# Patient Record
Sex: MALE | Employment: UNEMPLOYED | URBAN - METROPOLITAN AREA
[De-identification: names, ages, dates, MRNs, and addresses within clinical notes are randomized per-mention and may not be internally consistent; named-entity substitution may affect disease eponyms.]

---

## 2024-05-16 ENCOUNTER — APPOINTMENT (OUTPATIENT)
Dept: CT IMAGING | Age: 29
End: 2024-05-16
Payer: COMMERCIAL

## 2024-05-16 ENCOUNTER — HOSPITAL ENCOUNTER (EMERGENCY)
Age: 29
Discharge: HOME | End: 2024-05-16
Attending: EMERGENCY MEDICINE
Payer: COMMERCIAL

## 2024-05-16 VITALS
HEART RATE: 82 BPM | HEIGHT: 67 IN | DIASTOLIC BLOOD PRESSURE: 72 MMHG | WEIGHT: 150 LBS | SYSTOLIC BLOOD PRESSURE: 124 MMHG | TEMPERATURE: 97 F | BODY MASS INDEX: 23.54 KG/M2 | RESPIRATION RATE: 18 BRPM | OXYGEN SATURATION: 99 %

## 2024-05-16 DIAGNOSIS — S09.90XA INJURY OF HEAD, INITIAL ENCOUNTER: Primary | ICD-10-CM

## 2024-05-16 DIAGNOSIS — S01.81XA FACIAL LACERATION, INITIAL ENCOUNTER: ICD-10-CM

## 2024-05-16 PROCEDURE — 90715 TDAP VACCINE 7 YRS/> IM: CPT | Performed by: EMERGENCY MEDICINE

## 2024-05-16 PROCEDURE — 90471 IMMUNIZATION ADMIN: CPT | Performed by: EMERGENCY MEDICINE

## 2024-05-16 PROCEDURE — 6360000002 HC RX W HCPCS: Performed by: EMERGENCY MEDICINE

## 2024-05-16 PROCEDURE — 12011 RPR F/E/E/N/L/M 2.5 CM/<: CPT

## 2024-05-16 PROCEDURE — 99284 EMERGENCY DEPT VISIT MOD MDM: CPT

## 2024-05-16 PROCEDURE — 70450 CT HEAD/BRAIN W/O DYE: CPT

## 2024-05-16 PROCEDURE — 70486 CT MAXILLOFACIAL W/O DYE: CPT

## 2024-05-16 PROCEDURE — 12013 RPR F/E/E/N/L/M 2.6-5.0 CM: CPT

## 2024-05-16 PROCEDURE — 6370000000 HC RX 637 (ALT 250 FOR IP): Performed by: EMERGENCY MEDICINE

## 2024-05-16 RX ORDER — IBUPROFEN 800 MG/1
800 TABLET ORAL EVERY 8 HOURS PRN
Qty: 15 TABLET | Refills: 0 | Status: SHIPPED | OUTPATIENT
Start: 2024-05-16

## 2024-05-16 RX ORDER — IBUPROFEN 600 MG/1
600 TABLET ORAL ONCE
Status: COMPLETED | OUTPATIENT
Start: 2024-05-16 | End: 2024-05-16

## 2024-05-16 RX ORDER — BACITRACIN ZINC AND POLYMYXIN B SULFATE 500; 1000 [USP'U]/G; [USP'U]/G
OINTMENT TOPICAL
Qty: 15 G | Refills: 1 | Status: SHIPPED | OUTPATIENT
Start: 2024-05-16 | End: 2024-05-23

## 2024-05-16 RX ORDER — OXYCODONE HYDROCHLORIDE AND ACETAMINOPHEN 5; 325 MG/1; MG/1
2 TABLET ORAL ONCE
Status: COMPLETED | OUTPATIENT
Start: 2024-05-16 | End: 2024-05-16

## 2024-05-16 RX ADMIN — OXYCODONE HYDROCHLORIDE AND ACETAMINOPHEN 2 TABLET: 5; 325 TABLET ORAL at 20:57

## 2024-05-16 RX ADMIN — TETANUS TOXOID, REDUCED DIPHTHERIA TOXOID AND ACELLULAR PERTUSSIS VACCINE, ADSORBED 0.5 ML: 5; 2.5; 8; 8; 2.5 SUSPENSION INTRAMUSCULAR at 20:56

## 2024-05-16 RX ADMIN — IBUPROFEN 600 MG: 600 TABLET, FILM COATED ORAL at 22:01

## 2024-05-16 ASSESSMENT — PAIN DESCRIPTION - LOCATION
LOCATION: FACE
LOCATION: HEAD
LOCATION: HEAD

## 2024-05-16 ASSESSMENT — PAIN SCALES - GENERAL
PAINLEVEL_OUTOF10: 8
PAINLEVEL_OUTOF10: 6
PAINLEVEL_OUTOF10: 6

## 2024-05-16 ASSESSMENT — ENCOUNTER SYMPTOMS
BACK PAIN: 0
SHORTNESS OF BREATH: 0
NAUSEA: 0
COUGH: 0
VOMITING: 0

## 2024-05-16 ASSESSMENT — LIFESTYLE VARIABLES
HOW OFTEN DO YOU HAVE A DRINK CONTAINING ALCOHOL: NEVER
HOW MANY STANDARD DRINKS CONTAINING ALCOHOL DO YOU HAVE ON A TYPICAL DAY: PATIENT DOES NOT DRINK

## 2024-05-16 ASSESSMENT — PAIN DESCRIPTION - DESCRIPTORS
DESCRIPTORS: ACHING
DESCRIPTORS: THROBBING

## 2024-05-16 ASSESSMENT — PAIN - FUNCTIONAL ASSESSMENT: PAIN_FUNCTIONAL_ASSESSMENT: 0-10

## 2024-05-17 NOTE — ED NOTES
Pt presents to the ED c/o head injury over left eye that occurred at work an hour ago.   Pt denies any LOC but states he did experience dizziness at the time of injury as well as ongoing dizziness now.  Pt denies any nausea, vomiting, or vision changes.   Pt states the injury occurred while he was operating a retracting metal drill at work and the drill malfunctioned and hit him in the head.   Pt states he is up to date on his tetanus vaccine and received a booster 2 yrs ago.   Pt A&O x 4, does not appear in acute distress, RR even and unlabored.  Pt lying on stretcher with eyes open.   Vital signs obtained, medical hx and allergies reviewed with pt.   Triage completed.

## 2024-05-17 NOTE — ED PROVIDER NOTES
[facility] ED  EMERGENCY DEPARTMENT ENCOUNTER      Pt Name: Raf Barrera  MRN: [id number]  Birthdate 1995  Date of evaluation: 5/16/24  PCP:  No primary care provider on file.    CHIEF COMPLAINT:   Chief Complaint   Patient presents with    Head Injury     Denies LOC     HISTORY OF PRESENT ILLNESS   Raf Barrera is a 28 y.o. male whopresents with patient presents with injury to left hand.  Patient was at work and automated drill that hung from the ceiling excellently pulled away and hit him in the left eyebrow.  He denies loss of consciousness.  He states he was dizzy at the time of injury.  He denies nausea headache visual changes.  He denies any anticoagulation.  Sure of last tetanus.  He denies any pain to his neck, chest abdomen or pelvis or anywhere other than his left eyebrow.        REVIEW OF SYSTEMS       Review of Systems   Constitutional:  Negative for chills and fever.   Respiratory:  Negative for cough and shortness of breath.    Cardiovascular:  Negative for chest pain.   Gastrointestinal:  Negative for nausea and vomiting.   Musculoskeletal:  Negative for back pain and neck pain.   Neurological:  Positive for headaches. Negative for dizziness and loss of consciousness.           PAST MEDICAL HISTORY   PMH:  has a past medical history of Abnormal weight gain, ADHD (attention deficit hyperactivity disorder), Exposure to STD, History of allergy, Joint pain, knee, Learning disability, and Schlatter-Osgood disease.  SurgicalHistory:  has no past surgical history on file.  Social History:  reports that he has never smoked. He does not have any smokeless tobacco history on file. He reports current alcohol use. He reports current drug use. Frequency: 3.00 times per week. Drug: Marijuana (Weed).  Family History: Noncontributory at this time  Psychiatric History: Noncontributory at this time    Allergies:has No Known Allergies.      PHYSICAL EXAM     INITIAL VITALS: /72    [facility]  [address]  [facility] [address] [phone]    In 5 to 7 days for suture removal      DISCHARGEMEDICATIONS:  New Prescriptions    BACITRACIN-POLYMYXIN B (POLYSPORIN) 500-84851 UNIT/GM OINTMENT    Apply topically 2 times daily.    IBUPROFEN (ADVIL;MOTRIN) 800 MG TABLET    Take 1 tablet by mouth every 8 hours as needed for Pain       (Please note that portions of this note were completed with a voice recognition program.  Efforts were made to edit thedictations but occasionally words are mis-transcribed.)    Estuardo Aguero MD Attending Emergency Medicine Physician          Yomi Aguero MD  05/16/24 [id number]

## 2024-05-17 NOTE — ED NOTES
Report taken from previous shift RN. Pt introduced to writer. Pt resting in bed, respirations even and unlabored, NAD. Pt has no verbalized needs at this time. Whiteboards updated.

## 2024-05-21 ENCOUNTER — HOSPITAL ENCOUNTER (EMERGENCY)
Age: 29
Discharge: HOME | End: 2024-05-21
Attending: EMERGENCY MEDICINE
Payer: COMMERCIAL

## 2024-05-21 VITALS
DIASTOLIC BLOOD PRESSURE: 85 MMHG | SYSTOLIC BLOOD PRESSURE: 142 MMHG | HEART RATE: 77 BPM | RESPIRATION RATE: 14 BRPM | OXYGEN SATURATION: 100 % | TEMPERATURE: 98.2 F

## 2024-05-21 DIAGNOSIS — Z48.02 VISIT FOR SUTURE REMOVAL: Primary | ICD-10-CM

## 2024-05-21 DIAGNOSIS — S01.80XD OPEN WOUND OF FOREHEAD, SUBSEQUENT ENCOUNTER: ICD-10-CM

## 2024-05-21 PROCEDURE — 99282 EMERGENCY DEPT VISIT SF MDM: CPT

## 2024-05-21 ASSESSMENT — PAIN DESCRIPTION - DESCRIPTORS: DESCRIPTORS: OTHER (COMMENT)

## 2024-05-21 ASSESSMENT — ENCOUNTER SYMPTOMS
GASTROINTESTINAL NEGATIVE: 1
RESPIRATORY NEGATIVE: 1
FACIAL SWELLING: 0

## 2024-05-21 ASSESSMENT — PAIN DESCRIPTION - ORIENTATION: ORIENTATION: LEFT

## 2024-05-21 ASSESSMENT — PAIN - FUNCTIONAL ASSESSMENT
PAIN_FUNCTIONAL_ASSESSMENT: ACTIVITIES ARE NOT PREVENTED
PAIN_FUNCTIONAL_ASSESSMENT: 0-10

## 2024-05-21 ASSESSMENT — PAIN SCALES - GENERAL: PAINLEVEL_OUTOF10: 3

## 2024-05-21 NOTE — ED NOTES
Pt to ED needing sutures removed in left eyebrow. Pt states sutures were placed 5 days ago and was told to come back in 5 days. Pt reports some tenderness in affected area. Pt denies any bleeding, but has one day with some green discharge. Site is clean and does not appear infected. Pt alert and oriented x4, talking in complete sentences, respirations even and unlabored. Pt acting age appropriate. White board updated, will continue to plan of care

## 2024-05-21 NOTE — DISCHARGE INSTRUCTIONS
You were seen for removal of sutures on your forehead (left eyebrow)  5 Stitched were taken out with no difficulty.  Keep area dry and clean.   You do not need a bandage or tape to cover the wound.    In case of drainage, redness, swelling near wound, contact your PCP or return to ED

## 2024-05-21 NOTE — ED PROVIDER NOTES
[facility] ED  Emergency Department Encounter  Emergency Medicine Resident     Pt Name:Raf Barrera  MRN: [id number]  Birthdate 1995  Date of evaluation: 5/21/24  PCP:  aNveed Gonzalez MD  Note Started: 2:48 PM EDT      CHIEF COMPLAINT       Chief Complaint   Patient presents with    Suture / Staple Removal     Left eyebrow       HISTORY OF PRESENT ILLNESS  (Location/Symptom, Timing/Onset, Context/Setting, Quality, Duration, Modifying Factors, Severity.)      Raf Barrera is a 28 y.o. male who presents with need for suture removal.  Patient had an injury left eyebrow while at work on the 16th, was seen in the ED and stitches were placed on his laceration..  Patient was asked to return in 5 days for suture removal.  Denies any discharge, bleeding, swelling to the area.  Does report mild tenderness.  Denies any fevers or chills.  Denies any history of diabetes or cigarette smoking.    PAST MEDICAL / SURGICAL / SOCIAL / FAMILY HISTORY      has a past medical history of Abnormal weight gain, ADHD (attention deficit hyperactivity disorder), Exposure to STD, History of allergy, Joint pain, knee, Learning disability, and Schlatter-Osgood disease.       has no past surgical history on file.      Social History     Socioeconomic History    Marital status: Single     Spouse name: Not on file    Number of children: Not on file    Years of education: Not on file    Highest education level: Not on file   Occupational History    Not on file   Tobacco Use    Smoking status: Never    Smokeless tobacco: Not on file   Substance and Sexual Activity    Alcohol use: Yes     Comment: 2-3 time    Drug use: Yes     Frequency: 3.0 times per week     Types: Marijuana (Weed)    Sexual activity: Not on file   Other Topics Concern    Not on file   Social History Narrative    Not on file     Social Determinants of Health     Financial Resource Strain: Not on file   Food Insecurity: Not on file    Transportation Needs: Not on file   Physical Activity: Not on file   Stress: Not on file   Social Connections: Not on file   Intimate Partner Violence: Not on file   Housing Stability: Not on file       No family history on file.    Allergies:  Patient has no known allergies.    Home Medications:  Prior to Admission medications    Medication Sig Start Date End Date Taking? Authorizing Provider   ibuprofen (ADVIL;MOTRIN) 800 MG tablet Take 1 tablet by mouth every 8 hours as needed for Pain 5/16/24   Yomi Aguero MD   bacitracin-polymyxin b (POLYSPORIN) 500-02676 UNIT/GM ointment Apply topically 2 times daily. 5/16/24 5/23/24  Yomi Aguero MD   Lisdexamfetamine Dimesylate (VYVANSE) 40 MG CAPS Take one capsule daily morning. 7/25/14   Dion Mitchell MD   doxycycline (VIBRAMYCIN) 100 MG capsule Take 100 mg by mouth 2 times daily.    Provider, MD Miryam         REVIEW OF SYSTEMS       Review of Systems   Constitutional:  Negative for chills, fatigue and fever.   HENT:  Negative for facial swelling.    Respiratory: Negative.     Cardiovascular: Negative.    Gastrointestinal: Negative.    Genitourinary: Negative.    Musculoskeletal: Negative.    Skin:  Positive for wound.   Neurological: Negative.  Negative for syncope, light-headedness and headaches.   Hematological:  Does not bruise/bleed easily.       PHYSICAL EXAM      INITIAL VITALS:   BP (!) 142/85   Pulse 77   Temp 98.2 °F (36.8 °C) (Oral)   Resp 14   SpO2 100%     Physical Exam  Constitutional:       Appearance: Normal appearance. He is normal weight.   HENT:      Head: Normocephalic and atraumatic.      Comments: Has intact sutures (5) that can be seen in his left eyebrow.  No bleeding, discharge, swelling noticed.  Appear to be noninfected, wound appears to be well-healing.  Skin:     General: Skin is warm and dry.      Capillary Refill: Capillary refill takes less than 2 seconds.   Neurological:      Mental Status: He is alert.

## 2024-05-21 NOTE — ED PROVIDER NOTES
[facility]     Emergency Department     Faculty Attestation  2:46 PM EDT      I performed a history and physical examination of the patient and discussed management with the resident. I have reviewed and agree with the resident’s findings including all diagnostic interpretations, and treatment plans as written. Any areas of disagreement are noted on the chart. I was personally present for the key portions of any procedures. I have documented in the chart those procedures where I was not present during the key portions. I have reviewed the emergency nurses triage note. I agree with the chief complaint, past medical history, past surgical history, allergies, medications, social and family history as documented unless otherwise noted below. Documentation of the HPI, Physical Exam and Medical Decision Making performed by scribes is based on my personal performance of the HPI, PE and MDM. For Physician Assistant/ Nurse Practitioner cases/documentation I have personally evaluated this patient and have completed at least one if not all key elements of the E/M (history, physical exam, and MDM). Additional findings are as noted.    Primary Care Physician: Naveed Gonzalez MD Christina R. Ellis, D.O, M.P.H  Attending Emergency Medicine Physician         Alyssa Ortiz, DO  05/21/24 1447